# Patient Record
Sex: FEMALE | Race: WHITE | NOT HISPANIC OR LATINO | URBAN - METROPOLITAN AREA
[De-identification: names, ages, dates, MRNs, and addresses within clinical notes are randomized per-mention and may not be internally consistent; named-entity substitution may affect disease eponyms.]

---

## 2018-10-11 ENCOUNTER — OUTPATIENT (OUTPATIENT)
Dept: OUTPATIENT SERVICES | Facility: HOSPITAL | Age: 48
LOS: 1 days | End: 2018-10-11
Payer: MEDICAID

## 2018-10-11 PROCEDURE — 72148 MRI LUMBAR SPINE W/O DYE: CPT | Mod: 26

## 2019-06-24 ENCOUNTER — EMERGENCY (EMERGENCY)
Facility: HOSPITAL | Age: 49
LOS: 1 days | End: 2019-06-24
Admitting: EMERGENCY MEDICINE
Payer: MEDICAID

## 2019-06-24 PROCEDURE — 70450 CT HEAD/BRAIN W/O DYE: CPT | Mod: 26

## 2019-06-24 PROCEDURE — 99285 EMERGENCY DEPT VISIT HI MDM: CPT

## 2019-06-25 PROCEDURE — 93010 ELECTROCARDIOGRAM REPORT: CPT

## 2019-10-17 ENCOUNTER — EMERGENCY (EMERGENCY)
Facility: HOSPITAL | Age: 49
LOS: 1 days | Discharge: HOME | End: 2019-10-17
Attending: EMERGENCY MEDICINE
Payer: MEDICAID

## 2019-10-17 VITALS
HEIGHT: 64 IN | HEART RATE: 117 BPM | WEIGHT: 190.04 LBS | SYSTOLIC BLOOD PRESSURE: 107 MMHG | OXYGEN SATURATION: 97 % | RESPIRATION RATE: 20 BRPM | TEMPERATURE: 98 F | DIASTOLIC BLOOD PRESSURE: 83 MMHG

## 2019-10-17 VITALS — RESPIRATION RATE: 18 BRPM | OXYGEN SATURATION: 97 % | HEART RATE: 104 BPM

## 2019-10-17 PROCEDURE — 99285 EMERGENCY DEPT VISIT HI MDM: CPT

## 2019-10-17 RX ORDER — ACETAMINOPHEN 500 MG
650 TABLET ORAL ONCE
Refills: 0 | Status: DISCONTINUED | OUTPATIENT
Start: 2019-10-17 | End: 2019-10-17

## 2019-10-17 RX ORDER — OXYCODONE AND ACETAMINOPHEN 5; 325 MG/1; MG/1
1 TABLET ORAL ONCE
Refills: 0 | Status: DISCONTINUED | OUTPATIENT
Start: 2019-10-17 | End: 2019-10-17

## 2019-10-17 RX ADMIN — OXYCODONE AND ACETAMINOPHEN 1 TABLET(S): 5; 325 TABLET ORAL at 18:08
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
## 2019-10-17 NOTE — ED PROVIDER NOTE - PHYSICAL EXAMINATION
Gen: Well appearing in NAD  Head: NC/AT  Neck: trachea midline  Resp:  No distress, CTAB  CV: RRR   exam deferred for SANE exam.  Ext: ecchymosis to bilateral forearms  Neuro:  A&O appears non focal  Skin:  Warm and dry as visualized
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
## 2019-10-17 NOTE — ED ADULT NURSE NOTE - ED STAT RN HANDOFF DETAILS
Pt agitated upon EMS arrival for transfer and requesting pain medication, pt refusing to get on stretcher until medicated, MD aware, pt medicated as per MD orders, report given to Luis SHAFER at [facility]

## 2020-10-30 ENCOUNTER — EMERGENCY (EMERGENCY)
Facility: HOSPITAL | Age: 50
LOS: 0 days | Discharge: HOME | End: 2020-10-30
Attending: EMERGENCY MEDICINE
Payer: MEDICAID

## 2020-10-30 VITALS
HEIGHT: 64 IN | TEMPERATURE: 98 F | DIASTOLIC BLOOD PRESSURE: 72 MMHG | OXYGEN SATURATION: 98 % | RESPIRATION RATE: 17 BRPM | SYSTOLIC BLOOD PRESSURE: 132 MMHG | HEART RATE: 60 BPM

## 2020-10-30 DIAGNOSIS — Z20.828 CONTACT WITH AND (SUSPECTED) EXPOSURE TO OTHER VIRAL COMMUNICABLE DISEASES: ICD-10-CM

## 2020-10-30 DIAGNOSIS — G40.909 EPILEPSY, UNSPECIFIED, NOT INTRACTABLE, WITHOUT STATUS EPILEPTICUS: ICD-10-CM

## 2020-10-30 DIAGNOSIS — I10 ESSENTIAL (PRIMARY) HYPERTENSION: ICD-10-CM

## 2020-10-30 PROCEDURE — 99283 EMERGENCY DEPT VISIT LOW MDM: CPT

## 2020-10-30 PROCEDURE — U0003: CPT

## 2020-10-30 NOTE — ED ADULT TRIAGE NOTE - CHIEF COMPLAINT QUOTE
BIBEMS from  for COVID swab. Patient reported to staff that she wanted a flu shot and the staff called EMS to get her COVID swabbed. Patient denies all symptoms, she just requested a flu shot.

## 2020-10-30 NOTE — ED STATDOCS - PATIENT PORTAL LINK FT
You can access the FollowMyHealth Patient Portal offered by [facility] by registering at the following website: [email or web address]. By joining [employer]’s FollowMyHealth portal, you will also be able to view your health information using other applications (apps) compatible with our system.

## 2020-10-30 NOTE — ED STATDOCS - PROGRESS NOTE DETAILS
Bruce SYED for ED attending, Dr. Ruffin: Pt states refusing to wait for results. Wants COVID swab and d/c. Understands her group home may not accept her back without results from COVID swab.

## 2020-10-30 NOTE — ED STATDOCS - NSFOLLOWUPINSTRUCTIONS_ED_ALL_ED_FT
please follow up with clinic.   you understand that your group home may not allow you back into group home without COVID results.   you will receive a text with results.   return to ED for any concerns

## 2020-10-30 NOTE — ED STATDOCS - OBJECTIVE STATEMENT
49 y/o F with PMHx of HTN and epilepsy presents to the ED BIBEMS from [facility] for COVID swab. Reports she wanted to get a flu shot but [facility] sent her to ED for COVID swab instead. Pt asymptomatic, no fever. Allergic to ibuprofen.

## 2020-10-30 NOTE — ED STATDOCS - NSFOLLOWUPCLINICS_GEN_ALL_ED_FT
[facility]  Family Medicine  [address] [place] [address]  Phone: [phone]  Fax:   Follow Up Time:

## 2020-10-31 PROBLEM — I10 ESSENTIAL (PRIMARY) HYPERTENSION: Chronic | Status: ACTIVE | Noted: 2019-10-17

## 2020-10-31 PROBLEM — G40.909 EPILEPSY, UNSPECIFIED, NOT INTRACTABLE, WITHOUT STATUS EPILEPTICUS: Chronic | Status: ACTIVE | Noted: 2019-10-17

## 2020-10-31 LAB — SARS-COV-2 RNA SPEC QL NAA+PROBE: SIGNIFICANT CHANGE UP

## 2021-03-09 ENCOUNTER — TRANSCRIPTION ENCOUNTER (OUTPATIENT)
Age: 51
End: 2021-03-09

## 2021-04-05 ENCOUNTER — APPOINTMENT (OUTPATIENT)
Dept: ORTHOPEDIC SURGERY | Facility: CLINIC | Age: 51
End: 2021-04-05
Payer: MEDICAID

## 2021-04-05 VITALS
HEIGHT: 63 IN | WEIGHT: 200 LBS | HEART RATE: 76 BPM | OXYGEN SATURATION: 97 % | SYSTOLIC BLOOD PRESSURE: 119 MMHG | DIASTOLIC BLOOD PRESSURE: 83 MMHG | BODY MASS INDEX: 35.44 KG/M2

## 2021-04-05 DIAGNOSIS — M48.061 SPINAL STENOSIS, LUMBAR REGION WITHOUT NEUROGENIC CLAUDICATION: ICD-10-CM

## 2021-04-05 DIAGNOSIS — M54.16 RADICULOPATHY, LUMBAR REGION: ICD-10-CM

## 2021-04-05 DIAGNOSIS — M25.562 PAIN IN RIGHT KNEE: ICD-10-CM

## 2021-04-05 DIAGNOSIS — M54.2 CERVICALGIA: ICD-10-CM

## 2021-04-05 DIAGNOSIS — G89.29 DORSALGIA, UNSPECIFIED: ICD-10-CM

## 2021-04-05 DIAGNOSIS — M25.561 PAIN IN RIGHT KNEE: ICD-10-CM

## 2021-04-05 DIAGNOSIS — G89.29 CERVICALGIA: ICD-10-CM

## 2021-04-05 DIAGNOSIS — M54.9 DORSALGIA, UNSPECIFIED: ICD-10-CM

## 2021-04-05 PROCEDURE — 73564 X-RAY EXAM KNEE 4 OR MORE: CPT | Mod: 50

## 2021-04-05 PROCEDURE — 99072 ADDL SUPL MATRL&STAF TM PHE: CPT

## 2021-04-05 PROCEDURE — 99204 OFFICE O/P NEW MOD 45 MIN: CPT

## 2021-04-05 NOTE — HISTORY OF PRESENT ILLNESS
[de-identified] : ABHAY WAYNE is a 50 year female being seen for initial visit bilateral knee pain.\par \par Of note she has a hx of chronic pain with chronic disc degeneration. She admits to lumbar disc fusion that was unsuccessful. She became septic after that procedure with was dx with cystitis. She was transferred to [facility] for spinal fusion hardware removal and clean out due to this. 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 She received years of therapy to regain strength to learn to walk again. She saw PM years ago for trigger point injections prior and PT and other conservative measures have failed. She refuses to see pain management again since she doesn’t want to be given narcotics.

## 2021-04-05 NOTE — DISCUSSION/SUMMARY
[de-identified] : ABHAY WAYNE is a 50 year female being seen for initial visit bilateral knee pain.\par \par Of note she has a hx of chronic pain with chronic disc degeneration. She admits to lumbar disc fusion that was unsuccessful. She became septic after that procedure with was dx with cystitis. She was transferred to [facility] for spinal fusion hardware removal and clean out due to this. 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 She received years of therapy to regain strength to learn to walk again. She saw PM years ago for trigger point injections prior and PT and other conservative measures have failed. She refuses to see pain management again since she doesn’t want to be given narcotics. She describes her pain deriving from her back and radiating down her legs. The knee pain is sharp and burning in nature. She reports numbness/tingling with weakness down both legs, frequently. \par \par We had a thorough discussion regarding the nature of her pain, the pathophysiology, as well as all treatment options. Based on her prior hx, clinical exam and radiographs performed today, I believe her primary complaint for pain to be lumbar radiculopathy. She was referred to psychiatry for this. She does have some positive findings on exam for patellofemoral knee pain syndrome of which we discussed physical therapy for. If this exacerbates her back she will d/c. She will follow up with us in the future on an prn basis. She agrees with the above plan and all questions were answered.\par

## 2021-04-05 NOTE — PHYSICAL EXAM
[de-identified] : Physical Exam:\par General: Well appearing, no acute distress, A&O\par Neurologic: A&Ox3, No focal deficits\par Head: NCAT without abrasions, lacerations, or ecchymosis to head, face, or scalp\par Eyes: No scleral icterus, no gross abnormalities\par Respiratory: Equal chest wall expansion bilaterally, no accessory muscle use\par Lymphatic: No lymphadenopathy palpated\par Skin: Warm and dry\par Psychiatric: Normal mood and affect\par \par Right Knee: Range of Motion in Degrees	\par 	  Claimant:	Normal:	\par Flexion Active	 135 	 135-degrees	\par Flexion Passive	 135	 135-degrees	\par Extension Active	 0-5	 0-5-degrees	\par Extension Passive	 0-5	 0-5-degrees	\par \par Tenderness over the tibial tubercle. No tenderness over the tendon at this time. No weakness to flexion/extension. Tenderness over the pes bursa. No evidence of instability in the AP plane or varus or valgus stress. Negative Lachman. Negative pivot shift. Negative anterior drawer test. Negative posterior drawer test. Negative Bailee. Negative Apley grind. No medial or lateral joint line tenderness. No tenderness over the medial and lateral facet of the patella. No patellofemoral crepitations. No lateral tilting patella. No patella apprehension. No crepitation in the medial and lateral femoral condyle. No proximal or distal swelling, edema or tenderness. No gross motor or sensory deficits. No intra-articular swelling. Extra-articular swelling over the proximal medial aspect of the tibia. 2+ DP and PT pulses. No varus or valgus malalignment. Skin is intact. No rashes, scars or lesions. \par  \par Left Knee: Range of Motion in Degrees	\par 	  Claimant:	Normal:	\par Flexion Active	 135 	 135-degrees	\par Flexion Passive	 135	 135-degrees	\par Extension Active	 0-5	 0-5-degrees	\par Extension Passive	 0-5	 0-5-degrees	\par \par No weakness to flexion/extension. No evidence of instability in the AP plane or varus or valgus stress. Negative Lachman. Negative pivot shift. Negative anterior drawer test. Negative posterior drawer test. Negative Bailee. Negative Apley grind. No medial or lateral joint line tenderness. No tenderness over the medial and lateral facet of the patella. No patellofemoral crepitations. No lateral tilting patella. No patellar apprehension. No crepitation in the medial and lateral femoral condyle. No proximal or distal swelling, edema or tenderness. No gross motor or sensory deficits. No intra-articular swelling. 2+ DP and PT pulses. No varus or valgus malalignment. Skin is intact. No rashes, scars or lesions. [de-identified] : The following radiographs were ordered and read by me during this patients visit. I reviewed each radiograph in detail with the patient and discussed the findings as highlighted below. \par \par 4 views of the left knee show no fracture, dislocation or bony lesions. There is no evidence of degenerative change in the medial, lateral and patellofemoral compartments with maintenance of the joint space. Otherwise unremarkable.\par \par 4 views of the right knee show no fracture, dislocation or bony lesions. There is no evidence of degenerative change in the medial, lateral and patellofemoral compartments with maintenance of the joint space. Otherwise unremarkable.

## 2021-04-12 ENCOUNTER — TRANSCRIPTION ENCOUNTER (OUTPATIENT)
Age: 51
End: 2021-04-12

## 2022-01-03 ENCOUNTER — TRANSCRIPTION ENCOUNTER (OUTPATIENT)
Age: 52
End: 2022-01-03

## 2022-05-22 ENCOUNTER — EMERGENCY (EMERGENCY)
Facility: HOSPITAL | Age: 52
LOS: 1 days | Discharge: HOME | End: 2022-05-22
Attending: STUDENT IN AN ORGANIZED HEALTH CARE EDUCATION/TRAINING PROGRAM
Payer: MEDICAID

## 2022-05-22 VITALS
HEIGHT: 64 IN | RESPIRATION RATE: 20 BRPM | HEART RATE: 84 BPM | DIASTOLIC BLOOD PRESSURE: 95 MMHG | OXYGEN SATURATION: 99 % | SYSTOLIC BLOOD PRESSURE: 139 MMHG | WEIGHT: 160.06 LBS | TEMPERATURE: 99 F

## 2022-05-22 VITALS
OXYGEN SATURATION: 99 % | RESPIRATION RATE: 18 BRPM | TEMPERATURE: 97 F | SYSTOLIC BLOOD PRESSURE: 115 MMHG | DIASTOLIC BLOOD PRESSURE: 68 MMHG | HEART RATE: 52 BPM

## 2022-05-22 PROCEDURE — 99283 EMERGENCY DEPT VISIT LOW MDM: CPT

## 2022-05-22 PROCEDURE — 99281 EMR DPT VST MAYX REQ PHY/QHP: CPT

## 2022-05-22 RX ORDER — QUETIAPINE FUMARATE 200 MG/1
1 TABLET, FILM COATED ORAL
Qty: 6 | Refills: 0
Start: 2022-05-22 | End: 2022-05-24

## 2022-05-22 RX ORDER — LEVETIRACETAM 250 MG/1
500 TABLET, FILM COATED ORAL
Refills: 0 | Status: DISCONTINUED | OUTPATIENT
Start: 2022-05-22 | End: 2022-05-27

## 2022-05-22 RX ORDER — QUETIAPINE FUMARATE 200 MG/1
100 TABLET, FILM COATED ORAL ONCE
Refills: 0 | Status: COMPLETED | OUTPATIENT
Start: 2022-05-22 | End: 2022-05-22

## 2022-05-22 RX ORDER — LEVETIRACETAM 250 MG/1
1 TABLET, FILM COATED ORAL
Qty: 6 | Refills: 0
Start: 2022-05-22 | End: 2022-05-24

## 2022-05-22 RX ORDER — QUETIAPINE FUMARATE 200 MG/1
2 TABLET, FILM COATED ORAL
Qty: 6 | Refills: 0
Start: 2022-05-22 | End: 2022-05-24

## 2022-05-22 RX ORDER — CLONAZEPAM 1 MG
1 TABLET ORAL
Qty: 6 | Refills: 0
Start: 2022-05-22 | End: 2022-05-24

## 2022-05-22 RX ORDER — CLONAZEPAM 1 MG
1.5 TABLET ORAL ONCE
Refills: 0 | Status: DISCONTINUED | OUTPATIENT
Start: 2022-05-22 | End: 2022-05-22

## 2022-05-22 RX ADMIN — QUETIAPINE FUMARATE 100 MILLIGRAM(S): 200 TABLET, FILM COATED ORAL at 17:39

## 2022-05-22 RX ADMIN — Medication 1.5 MILLIGRAM(S): at 17:39

## 2022-05-22 RX ADMIN — LEVETIRACETAM 500 MILLIGRAM(S): 250 TABLET, FILM COATED ORAL at 17:39

## 2022-05-22 NOTE — ED ADULT NURSE NOTE - PAIN RATING/NUMBER SCALE (0-10): ACTIVITY
Pt is calling to calling to inquire about his MRI results. Pt states he has not heard in over a week.  Please advise 0

## 2022-05-22 NOTE — ED PROVIDER NOTE - PATIENT PORTAL LINK FT
You can access the FollowMyHealth Patient Portal offered by [facility] by registering at the following website: [email or web address]. By joining [employer]’s FollowMyHealth portal, you will also be able to view your health information using other applications (apps) compatible with our system.

## 2022-05-22 NOTE — ED PROVIDER NOTE - CLINICAL SUMMARY MEDICAL DECISION MAKING FREE TEXT BOX
Pt is a 51y F presenting for medication refill. Will istop and send script to pharmacy. Pt has appointment Wednesday.

## 2022-05-22 NOTE — ED ADULT TRIAGE NOTE - CHIEF COMPLAINT QUOTE
Pt states "I take klonopin for my epilepsy and anxiety and ptsd and panic attacks."  Pt psych md left the state and they had fill in MD that didn't like "my medication regimen and wanted me to taper but I have epilepsy and didn't want to do that."  Pt endorsing upcoming appointment to new doctor on Wednesday but ran out of klonopin on Friday.  Pt needs refill for seroquel, keppra and klonopin.

## 2022-05-22 NOTE — ED ADULT NURSE NOTE - NSFALLRSKASSESSDT_ED_ALL_ED
22-May-2022 17:59 Siliq Pregnancy And Lactation Text: The risk during pregnancy and breastfeeding is uncertain with this medication.

## 2022-05-22 NOTE — ED PROVIDER NOTE - OBJECTIVE STATEMENT
Pt is a 51y F with PMH of epilepsy/ panic attacks/ anxiety presenting for medication refill. Pt states she was seeing her doctor at the [facility] clinic but he left unexpectedly and she had to see a new provider. The new provider has been trying to get her off her medications and pt state she has been on these medications for 20 years and does not feel like this is the best thing for her. She states she has been tapering off her klonopin and ran out of her seroquel and keppra. She takes seroquel 100 mg/ keppra 1000mg and klonopin 2 mg 2 times a day. She denies any other complaints. Pt has appointment with a new doctor on Wednesday.

## 2022-05-22 NOTE — ED PROVIDER NOTE - NSTIMEPROVIDERCAREINITIATE_GEN_ER
22-May-2022 15:23 Hydroxychloroquine Counseling:  I discussed with the patient that a baseline ophthalmologic exam is needed at the start of therapy and every year thereafter while on therapy. A CBC may also be warranted for monitoring.  The side effects of this medication were discussed with the patient, including but not limited to agranulocytosis, aplastic anemia, seizures, rashes, retinopathy, and liver toxicity. Patient instructed to call the office should any adverse effect occur.  The patient verbalized understanding of the proper use and possible adverse effects of Plaquenil.  All the patient's questions and concerns were addressed.

## 2022-10-27 NOTE — ED PROVIDER NOTE - PRINCIPAL DIAGNOSIS
Received voicemail from patient's dtr requesting refill of Norco.     Last refill: 8/18/22  Last office visit: 8/18/22  Scheduled for follow up planned in Jan/Feb, msg sent to scheduling.     Will route request to MD for review.     Reviewed [place]  Report.    Pt will be moving to an [facility]. Dtr requesting that wording be changed on prescription from TID PRN to BID scheduled and once daily PRN, otherwise pt will have to call staff for every dose. She consistently takes 2 tabs daily.     Medication refill

## 2024-03-12 NOTE — ED ADULT TRIAGE NOTE - TEMPERATURE IN CELSIUS (DEGREES C)
No care due was identified.  [facility] Embedded Care Due Messages. Reference number: [id number].   3/12/2024 2:37:38 PM CDT   36.4

## 2024-11-02 ENCOUNTER — EMERGENCY (EMERGENCY)
Facility: HOSPITAL | Age: 54
LOS: 1 days | Discharge: HOME | End: 2024-11-02
Attending: EMERGENCY MEDICINE
Payer: MEDICARE

## 2024-11-02 VITALS
DIASTOLIC BLOOD PRESSURE: 105 MMHG | SYSTOLIC BLOOD PRESSURE: 138 MMHG | WEIGHT: 149.91 LBS | HEIGHT: 66 IN | RESPIRATION RATE: 18 BRPM | OXYGEN SATURATION: 98 % | HEART RATE: 80 BPM | TEMPERATURE: 98 F

## 2024-11-02 PROCEDURE — 99284 EMERGENCY DEPT VISIT MOD MDM: CPT

## 2024-11-02 RX ORDER — METHADONE HYDROCHLORIDE 10 MG/1
180 TABLET ORAL ONCE
Refills: 0 | Status: DISCONTINUED | OUTPATIENT
Start: 2024-11-02 | End: 2024-11-02

## 2024-11-02 RX ADMIN — METHADONE HYDROCHLORIDE 180 MILLIGRAM(S): 10 TABLET ORAL at 12:20

## 2024-11-02 NOTE — ED PROVIDER NOTE - OBJECTIVE STATEMENT
55 y/o F requesting methadone dose for today.  Patient attends a clinic in [place] but states that her transportation service .  Spoke with Luli Mo - 180 mg methadone on 10/30 at [facility] in [place] (last dose)

## 2024-11-02 NOTE — ED PROVIDER NOTE - CLINICAL SUMMARY MEDICAL DECISION MAKING FREE TEXT BOX
53 y/o F requesting medication refill - spoke with [facility] - verified last methadone dose 180 mg on 10/30.

## 2024-11-02 NOTE — ED PROVIDER NOTE - ATTENDING APP SHARED VISIT CONTRIBUTION OF CARE
54 year old female PMHx OUD requesting methadone maintenance therapy. no other complaints. dosage confirmed with clinic

## 2024-11-02 NOTE — ED ADULT TRIAGE NOTE - CHIEF COMPLAINT QUOTE
pt requesting methadone dose, last dose Wednesday. pt has had transportation issue to get to methadone clinic in [place].

## 2024-11-02 NOTE — ED PROVIDER NOTE - PATIENT PORTAL LINK FT
You can access the FollowMyHealth Patient Portal offered by [facility] by registering at the following website: [email or web address]. By joining [employer]’s FollowMyHealth portal, you will also be able to view your health information using other applications (apps) compatible with our system.

## 2024-11-04 ENCOUNTER — EMERGENCY (EMERGENCY)
Facility: HOSPITAL | Age: 54
LOS: 1 days | Discharge: HOME | End: 2024-11-04
Attending: EMERGENCY MEDICINE
Payer: MEDICAID

## 2024-11-04 VITALS
WEIGHT: 160.06 LBS | RESPIRATION RATE: 18 BRPM | TEMPERATURE: 99 F | SYSTOLIC BLOOD PRESSURE: 117 MMHG | DIASTOLIC BLOOD PRESSURE: 82 MMHG | OXYGEN SATURATION: 96 % | HEART RATE: 106 BPM | HEIGHT: 66 IN

## 2024-11-04 PROCEDURE — 99283 EMERGENCY DEPT VISIT LOW MDM: CPT

## 2024-11-04 RX ORDER — METHADONE HYDROCHLORIDE 10 MG/1
180 TABLET ORAL ONCE
Refills: 0 | Status: DISCONTINUED | OUTPATIENT
Start: 2024-11-04 | End: 2024-11-04

## 2024-11-04 RX ADMIN — METHADONE HYDROCHLORIDE 180 MILLIGRAM(S): 10 TABLET ORAL at 13:22

## 2024-11-04 NOTE — ED PROVIDER NOTE - OBJECTIVE STATEMENT
54-year-old female presents ED requesting her dose of methadone after being released from incarceration today. patient states she is unable to make it to [place] to receive her dose at her methadone clinic because they close at 1:30 PM.  Patient was seen in ED 2 days ago for her daily methadone dose of 180 mg prior to going to court.  Patient denies any other complaints at this time

## 2024-11-04 NOTE — ED PROVIDER NOTE - PATIENT PORTAL LINK FT
You can access the FollowMyHealth Patient Portal offered by [facility] by registering at the following website: [email or web address]. By joining [employer]’s FollowMyHealth portal, you will also be able to view your health information using other applications (apps) compatible with our system.

## 2024-11-04 NOTE — ED ADULT NURSE NOTE - CHIEF COMPLAINT QUOTE
BIBEMS requesting methadone dose. Pt states that she has been unable to go to her clinic in [place]. Had her dose on Saturday, went to court today and missed her dose. Denies etoh use and illicit drug use.

## 2024-11-04 NOTE — ED ADULT TRIAGE NOTE - CHIEF COMPLAINT QUOTE
BIBEMS requesting methadone dose. Pt states that she has been unable to go to her clinic in [place]. Had her dose on Saturday, went to court today and missed her dose. Denies etoh use and illicit drug use.

## 2024-11-04 NOTE — ED PROVIDER NOTE - CLINICAL SUMMARY MEDICAL DECISION MAKING FREE TEXT BOX
54-year-old female presents ED requesting her dose of methadone after being released from incarceration today. patient states she is unable to make it to [place] to receive her dose at her methadone clinic because they close at 1:30 PM.  Patient was seen in ED 2 days ago for her daily methadone dose of 180 mg prior to going to court.  Patient denies any other complaints at this time.  Methadone dose confirmed by SONI Holley at [facility]  methadone [facility] in [place] [phone].   180 mg of methadone ordered and will DC after patient receives her dose